# Patient Record
Sex: MALE | Race: WHITE
[De-identification: names, ages, dates, MRNs, and addresses within clinical notes are randomized per-mention and may not be internally consistent; named-entity substitution may affect disease eponyms.]

---

## 2019-01-13 ENCOUNTER — TRANSCRIPTION ENCOUNTER (OUTPATIENT)
Age: 54
End: 2019-01-13

## 2022-06-21 ENCOUNTER — APPOINTMENT (OUTPATIENT)
Dept: ORTHOPEDIC SURGERY | Facility: CLINIC | Age: 57
End: 2022-06-21
Payer: COMMERCIAL

## 2022-06-21 VITALS — HEIGHT: 65 IN | WEIGHT: 165 LBS | BODY MASS INDEX: 27.49 KG/M2

## 2022-06-21 DIAGNOSIS — M18.12 UNILATERAL PRIMARY OSTEOARTHRITIS OF FIRST CARPOMETACARPAL JOINT, LEFT HAND: ICD-10-CM

## 2022-06-21 DIAGNOSIS — M77.12 LATERAL EPICONDYLITIS, LEFT ELBOW: ICD-10-CM

## 2022-06-21 PROBLEM — Z00.00 ENCOUNTER FOR PREVENTIVE HEALTH EXAMINATION: Status: ACTIVE | Noted: 2022-06-21

## 2022-06-21 PROCEDURE — 73130 X-RAY EXAM OF HAND: CPT | Mod: LT

## 2022-06-21 PROCEDURE — 20605 DRAIN/INJ JOINT/BURSA W/O US: CPT

## 2022-06-21 PROCEDURE — 20551 NJX 1 TENDON ORIGIN/INSJ: CPT

## 2022-06-21 PROCEDURE — 99203 OFFICE O/P NEW LOW 30 MIN: CPT | Mod: 25

## 2022-06-21 PROCEDURE — 73080 X-RAY EXAM OF ELBOW: CPT | Mod: LT

## 2022-06-21 NOTE — PROCEDURE
[Tendon Origin] : tendon origin [Lateral Epicondyle] : lateral epicondyle [Medium Joint Injection] : medium joint injection [Left] : of the left [CMC Joint] : CMC joint [Pain] : pain [Alcohol] : alcohol [Betadine] : betadine [___ cc    3mg] :  Betamethasone ([place]) ~Vcc of 3mg [___ cc    1%] : Lidocaine ~Vcc of 1%

## 2022-06-21 NOTE — HISTORY OF PRESENT ILLNESS
[Sudden] : sudden [7] : 7 [1] : 2 [Sharp] : sharp [Throbbing] : throbbing [Intermittent] : intermittent [de-identified] : 57 yr old M Pt C/O Lt thumb and Lt arm pain. OPt denies any traumatic injury . Pt states that pain started a couple weeks ago. Pt denies N/T, Loss of strength in his LT hand. Pain is localized in his forearm. [] : no [FreeTextEntry1] : LT arm and Lt Thumb [FreeTextEntry7] : up the arm into the forearm

## 2022-06-21 NOTE — PHYSICAL EXAM
[NL (150)] : flexion 150 degrees [NL (0)] : extension 0 degrees [NL (90)] : supination 90 degrees [5___] : supination 5[unfilled]/5 [There are no fractures, subluxations or dislocations. No significant abnormalities are seen] : There are no fractures, subluxations or dislocations. No significant abnormalities are seen [1st] : 1st [CMC Joint] : CMC joint [Left] : left hand [Degenerative change] : Degenerative change [] : no swelling [FreeTextEntry1] : cmc oa thumb

## 2024-01-27 ENCOUNTER — NON-APPOINTMENT (OUTPATIENT)
Age: 59
End: 2024-01-27